# Patient Record
Sex: FEMALE | Race: WHITE
[De-identification: names, ages, dates, MRNs, and addresses within clinical notes are randomized per-mention and may not be internally consistent; named-entity substitution may affect disease eponyms.]

---

## 2020-01-06 ENCOUNTER — HOSPITAL ENCOUNTER (OUTPATIENT)
Dept: HOSPITAL 39 - MAMMO | Age: 65
End: 2020-01-06
Attending: FAMILY MEDICINE
Payer: COMMERCIAL

## 2020-01-06 DIAGNOSIS — Z12.31: Primary | ICD-10-CM

## 2020-01-07 NOTE — MAM
EXAM DESCRIPTION: 

3D Screening BILATERAL : Digital Mammography.



CLINICAL HISTORY: 

64 years Female ANNUAL SCREENING . No complaints or personal

history of breast cancer. Family history unknown due to adoption.

Menarche age 10. Childbirth age 22. Menopause age 48. No HRT..

Lifetime risk of developing breast cancer (Tyrer-Cuzick

model)(%):  5.4.



COMPARISON: 

Baseline study at this facility..  No prior reports available.  



TECHNIQUE: 

Bilateral CC and MLO projection full-field images, digital

tomosynthesis mammographic technique. Bilateral digital 2-D

full-field MLO images.  CAD not available for tomosynthesis or

2-D images.  



FINDINGS: 

The breast parenchymal density pattern is: Scattered areas of

fibroglandular density. No skin thickening or nipple retraction. 

Bilateral solitary parenchymal microcalcifications.

No new focal, stellate mass or density, focal asymmetry , and no

suspicious microcalcifications bilaterally. 



IMPRESSION: 

Benign exam.



BIRAD CATEGORY: 2 BENIGN FINDINGS.



RECOMMENDATIONS:

FOLLOW UP: Routine digital bilateral  mammographic screening, one

year interval from  January 2019.



Written communication explaining the IMPRESSION and follow-up,

will be mailed to the patient and referring health care provider.

The FINDINGS  and the FOLLOW-UP plan were reviewed in person with

the patient after the examination.



According to the [employer], yearly mammograms

are recommended starting at age 40 and continuing as long as a

woman is in good health.  Any breast change noted on a breast

self-exam should be reported promptly to the patient's healthcare

provider.  Breast MRI is recommended for women with an

approximately 20-25% or greater lifetime risk of breast cancer,

including women with a strong family history of breast or ovarian

cancer and women who have been treated for Hodgkin's disease.



A negative mammographic report should not delay tissue diagnosis

in patients with significant clinical history or physical

findings.



Extremely dense breast tissue limits the sensitivity of digital

mammography. 



Electronically signed by:  Chau Young MD  1/7/2020 8:17 PM CST

Workstation: [id number]